# Patient Record
Sex: FEMALE | Race: AMERICAN INDIAN OR ALASKA NATIVE
[De-identification: names, ages, dates, MRNs, and addresses within clinical notes are randomized per-mention and may not be internally consistent; named-entity substitution may affect disease eponyms.]

---

## 2017-05-17 ENCOUNTER — HOSPITAL ENCOUNTER (EMERGENCY)
Dept: HOSPITAL 5 - ED | Age: 62
Discharge: LEFT BEFORE DISCHARGE | End: 2017-05-17
Payer: SELF-PAY

## 2017-05-17 VITALS — DIASTOLIC BLOOD PRESSURE: 89 MMHG | SYSTOLIC BLOOD PRESSURE: 141 MMHG

## 2017-05-17 DIAGNOSIS — Z53.21: ICD-10-CM

## 2017-05-17 DIAGNOSIS — R11.2: Primary | ICD-10-CM

## 2017-05-17 DIAGNOSIS — R19.7: ICD-10-CM

## 2017-05-17 LAB
ANION GAP SERPL CALC-SCNC: 17 MMOL/L
BACTERIA #/AREA URNS HPF: (no result) /HPF
BASOPHILS NFR BLD AUTO: 0.7 % (ref 0–1.8)
BILIRUB UR QL STRIP: (no result)
BLOOD UR QL VISUAL: (no result)
BUN SERPL-MCNC: 10 MG/DL (ref 7–17)
BUN/CREAT SERPL: 20 %
CALCIUM SERPL-MCNC: 9.2 MG/DL (ref 8.4–10.2)
CHLORIDE SERPL-SCNC: 97.2 MMOL/L (ref 98–107)
CO2 SERPL-SCNC: 27 MMOL/L (ref 22–30)
EOSINOPHIL NFR BLD AUTO: 4.5 % (ref 0–4.3)
GLUCOSE SERPL-MCNC: 103 MG/DL (ref 65–100)
HCT VFR BLD CALC: 33.4 % (ref 30.3–42.9)
HGB BLD-MCNC: 11 GM/DL (ref 10.1–14.3)
KETONES UR STRIP-MCNC: (no result) MG/DL
LEUKOCYTE ESTERASE UR QL STRIP: (no result)
MCH RBC QN AUTO: 35 PG (ref 28–32)
MCHC RBC AUTO-ENTMCNC: 33 % (ref 30–34)
MCV RBC AUTO: 107 FL (ref 79–97)
MUCOUS THREADS #/AREA URNS HPF: (no result) /HPF
NITRITE UR QL STRIP: (no result)
PH UR STRIP: 7 [PH] (ref 5–7)
PLATELET # BLD: 51 K/MM3 (ref 140–440)
POTASSIUM SERPL-SCNC: 2.8 MMOL/L (ref 3.6–5)
RBC # BLD AUTO: 3.12 M/MM3 (ref 3.65–5.03)
RBC #/AREA URNS HPF: 10 /HPF (ref 0–6)
SODIUM SERPL-SCNC: 138 MMOL/L (ref 137–145)
UROBILINOGEN UR-MCNC: 4 MG/DL (ref ?–2)
WBC # BLD AUTO: 6.1 K/MM3 (ref 4.5–11)
WBC #/AREA URNS HPF: 5 /HPF (ref 0–6)

## 2017-05-17 PROCEDURE — 80048 BASIC METABOLIC PNL TOTAL CA: CPT

## 2017-05-17 PROCEDURE — 81001 URINALYSIS AUTO W/SCOPE: CPT

## 2017-05-17 PROCEDURE — 36415 COLL VENOUS BLD VENIPUNCTURE: CPT

## 2017-05-17 PROCEDURE — 85025 COMPLETE CBC W/AUTO DIFF WBC: CPT

## 2017-05-19 NOTE — ED ELOPEMENT REVIEW
ED Pt Elopement review





- Results review


Lab results: 





 Laboratory Tests











  05/17/17 05/17/17 05/17/17





  09:25 09:25 10:37


 


WBC  6.1  


 


RBC  3.12 L  


 


Hgb  11.0  


 


Hct  33.4  


 


MCV  107 H  


 


MCH  35 H  


 


MCHC  33  


 


RDW  13.0 L  


 


Plt Count  51 L  


 


Lymph % (Auto)  23.6  


 


Mono % (Auto)  14.2 H  


 


Eos % (Auto)  4.5 H  


 


Baso % (Auto)  0.7  


 


Lymph #  1.4  


 


Mono #  0.9 H  


 


Eos #  0.3  


 


Baso #  0.0  


 


Seg Neutrophils %  57.0  


 


Seg Neutrophils #  3.5  


 


Sodium   138 


 


Potassium   2.8 L* 


 


Chloride   97.2 L 


 


Carbon Dioxide   27 


 


Anion Gap   17 


 


BUN   10 


 


Creatinine   0.5 L 


 


Estimated GFR   > 60 


 


BUN/Creatinine Ratio   20.00 


 


Glucose   103 H 


 


Calcium   9.2 


 


Urine Color    Iwona


 


Urine Turbidity    Clear


 


Urine pH    7.0


 


Ur Specific Gravity    1.024


 


Urine Protein    100 mg/dl


 


Urine Glucose (UA)    Neg


 


Urine Ketones    Neg


 


Urine Blood    Neg


 


Urine Nitrite    Neg


 


Urine Bilirubin    Sm


 


Urine Ictotest    Negative


 


Urine Urobilinogen    4.0


 


Ur Leukocyte Esterase    Sm


 


Urine WBC (Auto)    5.0


 


Urine RBC (Auto)    10.0


 


U Epithel Cells (Auto)    3.0


 


Urine Bacteria (Auto)    1+


 


Urine Mucus    Few














- Call Back decision


Pt Call Back Decision: Call pt to return to ED ASAP (hypokalemia of 2.8 with 

diarrhea should be addressed.  Patient has a primary physician that she'll 

follow up with them otherwise return to the ED)

## 2017-05-31 ENCOUNTER — HOSPITAL ENCOUNTER (EMERGENCY)
Dept: HOSPITAL 5 - ED | Age: 62
Discharge: HOME | End: 2017-05-31
Payer: SELF-PAY

## 2017-05-31 VITALS — DIASTOLIC BLOOD PRESSURE: 77 MMHG | SYSTOLIC BLOOD PRESSURE: 113 MMHG

## 2017-05-31 DIAGNOSIS — M32.9: ICD-10-CM

## 2017-05-31 DIAGNOSIS — H53.8: Primary | ICD-10-CM

## 2017-05-31 LAB
ALBUMIN SERPL-MCNC: 3.6 G/DL (ref 3.9–5)
ALBUMIN/GLOB SERPL: 0.6 %
ALP SERPL-CCNC: 120 UNITS/L (ref 35–129)
ALT SERPL-CCNC: 43 UNITS/L (ref 7–56)
ANION GAP SERPL CALC-SCNC: 17 MMOL/L
APTT BLD: 37.1 SEC. (ref 24.2–36.6)
BILIRUB SERPL-MCNC: 1.9 MG/DL (ref 0.1–1.2)
BLASTOCYTES % (MANUAL): 0 %
BUN SERPL-MCNC: 9 MG/DL (ref 7–17)
BUN/CREAT SERPL: 18 %
CALCIUM SERPL-MCNC: 10 MG/DL (ref 8.4–10.2)
CHLORIDE SERPL-SCNC: 99.1 MMOL/L (ref 98–107)
CO2 SERPL-SCNC: 26 MMOL/L (ref 22–30)
GLUCOSE SERPL-MCNC: 94 MG/DL (ref 65–100)
HCT VFR BLD CALC: 34.2 % (ref 30.3–42.9)
HGB BLD-MCNC: 11.4 GM/DL (ref 10.1–14.3)
INR PPP: 1.22 (ref 0.87–1.13)
MACROCYTES BLD QL SMEAR: (no result)
MCH RBC QN AUTO: 36 PG (ref 28–32)
MCHC RBC AUTO-ENTMCNC: 33 % (ref 30–34)
MCV RBC AUTO: 108 FL (ref 79–97)
PLATELET # BLD: 130 K/MM3 (ref 140–440)
POTASSIUM SERPL-SCNC: 4.3 MMOL/L (ref 3.6–5)
PROT SERPL-MCNC: 9.4 G/DL (ref 6.3–8.2)
RBC # BLD AUTO: 3.18 M/MM3 (ref 3.65–5.03)
SODIUM SERPL-SCNC: 138 MMOL/L (ref 137–145)
TOTAL CELLS COUNTED PERCENT: 14
WBC # BLD AUTO: 5.4 K/MM3 (ref 4.5–11)

## 2017-05-31 PROCEDURE — 85610 PROTHROMBIN TIME: CPT

## 2017-05-31 PROCEDURE — 36415 COLL VENOUS BLD VENIPUNCTURE: CPT

## 2017-05-31 PROCEDURE — 85007 BL SMEAR W/DIFF WBC COUNT: CPT

## 2017-05-31 PROCEDURE — 85730 THROMBOPLASTIN TIME PARTIAL: CPT

## 2017-05-31 PROCEDURE — 70450 CT HEAD/BRAIN W/O DYE: CPT

## 2017-05-31 PROCEDURE — 85025 COMPLETE CBC W/AUTO DIFF WBC: CPT

## 2017-05-31 PROCEDURE — 80053 COMPREHEN METABOLIC PANEL: CPT

## 2017-05-31 NOTE — CAT SCAN REPORT
CT HEAD WITHOUT CONTRAST



INDICATION: Vision changes, flashing in eyes.



COMPARISON: None similar at this institution.



FINDINGS: Noncontrast head CT demonstrates age-appropriate, symmetric 

ventricles and sulci without acute or recent infarct, hemorrhage, mass 

effect or midline shift.  Mild periventricular hypodensities.  No 

abnormal extra-axial fluid collections. Posterior fossa structures and 

basilar cisterns appear within normal limits.



Symmetric eye globes. Clear paranasal sinuses and mastoid air cells. 

Intact calvarium. Normal overlying scalp soft tissues. Few radiopaque 

dental material incidentally noted.



CONCLUSION: No acute intracranial CT abnormality, as described.



Thank you for the opportunity to participate in this patient's care.

## 2017-05-31 NOTE — EMERGENCY DEPARTMENT REPORT
HPI





- General


Chief Complaint: Eye Problems


Time Seen by Provider: 17 16:48





- HPI


HPI: 


This is a 62-year-old [facility] female presents to the emergency department 

with a one-month history of intermittent flashing of lights in her vision.  She 

denies any headache, pain in the eyes.  The patient has history of lupus and 

says that her doctor back in [place] start her on a medication for lupus at 

about the same time that the symptoms began.  The medication is 

hydroxychloroquine.  She otherwise denies any other past medical history.  She 

denies any tobacco abuse or any illicit drug use.  She has not taken anything 

for symptoms prior to presentation.  She does not wear glasses or contacts.








ED Past Medical Hx





- Past Medical History


Previous Medical History?: Yes


Additional medical history: LUPUS





- Surgical History


Past Surgical History?: Yes


Additional Surgical History: .  HYSTERECTOMY





- Social History


Smoking Status: Never Smoker


Substance Use Type: None





ED Review of Systems


ROS: 


Stated complaint: VISION ISSUE /BOTH EYES/


Other details as noted in HPI





Comment: All other systems reviewed and negative


Constitutional: denies: chills, fever


Eyes: other (lights flashing in vision).  denies: eye pain, eye discharge


ENT: denies: ear pain, throat pain


Respiratory: denies: cough, shortness of breath, wheezing


Cardiovascular: denies: chest pain, palpitations


Gastrointestinal: denies: abdominal pain, nausea, diarrhea


Genitourinary: denies: urgency, dysuria, discharge


Musculoskeletal: denies: back pain, joint swelling, arthralgia


Skin: denies: rash, lesions


Neurological: denies: headache, weakness, paresthesias





Physical Exam





- Physical Exam


Vital Signs: 


 Vital Signs











  17





  14:30


 


Temperature 98.1 F


 


Pulse Rate 73


 


Respiratory 18





Rate 


 


Blood Pressure 113/77


 


O2 Sat by Pulse 100





Oximetry 











Physical Exam: 


GENERAL: The patient is well-developed well-nourished.


HEENT: Normocephalic.  Atraumatic.  Extraocular motions are intact.  Patient 

has moist mucous membranes.  Pupils equal reactive to light bilaterally.  No 

nystagmus.  Visual acuity: 20/13 both eyes, 20/15 OD, 20/15 OS


NECK: Supple.  Trachea is midline.


CHEST/LUNGS: Clear to auscultation.  There is no respiratory distress noted.


HEART/CARDIOVASCULAR: Regular.  There is no tachycardia.  There is no gallop 

rub or murmur.


ABDOMEN: Abdomen is soft, nontender.  Patient has normal bowel sounds.  There 

is no abdominal distention.


SKIN: Skin is warm and dry.


NEURO: The patient is awake, alert, and oriented.  The patient is cooperative.  

The patient has no focal neurologic deficits.  The patient has normal speech 

and gait.  Cranial nerves II through XII grossly intact.


MUSCULOSKELETAL: There is no tenderness or deformity.  There is no limitation 

range of motion.  There is no evidence of acute injury.








ED Course


 Vital Signs











  17





  14:30


 


Temperature 98.1 F


 


Pulse Rate 73


 


Respiratory 18





Rate 


 


Blood Pressure 113/77


 


O2 Sat by Pulse 100





Oximetry 














ED Medical Decision Making





- Lab Data


Result diagrams: 


 17 15:02





 17 15:02





- Radiology Data


Radiology results: report reviewed


CT of the head does not show any acute process including no hemorrhage, mass, 

shift, diffuse edema or skull fracture.








- Medical Decision Making


62-year-old female presents with the complaint of occasional flashing lights in 

her vision.  This occurs both in dark rooms and lit rooms.  It occurs only 

intermittently.  She denies any actual vision change and denies any headache or 

pain in the eyes.  It started around the time the patient was started on 

hydroxychloroquine so this could be one source of her symptoms.  This could be 

a ocular migraine.  It is less likely to be a retinal issue as it occurs in lit 

rooms and bilaterally.  However the patient appears safe for discharge home at 

this time.  Vital signs stable throughout her ED course.  She'll be given 

ophthalmology referrals and encouraged to return with any worsening or symptoms 

or any acute distress.








- Differential Diagnosis


ocular migraine, medication induced, retinal detachment


Critical Care Time: No


Critical care attestation.: 


If time is entered above; I have spent that time in minutes in the direct care 

of this critically ill patient, excluding procedure time.








ED Disposition


Clinical Impression: 


 Flashing lights seen





Disposition: DISCHARGED TO HOME OR SELFCARE


Is pt being admited?: No


Condition: Stable


Instructions:  Blurred Vision (ED)


Additional Instructions: 


Please follow-up with an ophthalmologist in the next few days if possible.  

Return to the ER with any worsening of your symptoms or any acute distress.


Referrals: 


ZEUS DENNIS MD [Staff Physician] - 3-5 Days


LANDY ARCOS MD [Staff Physician] - 3-5 Days


DUBINER,HARVEY B, MD [Staff Physician] - 3-5 Days


NELLIE STEPHENSON MD [Staff Physician] - 3-5 Days


Time of Disposition: 17:25

## 2018-05-20 ENCOUNTER — HOSPITAL ENCOUNTER (EMERGENCY)
Dept: HOSPITAL 5 - ED | Age: 63
Discharge: LEFT BEFORE DISCHARGE | End: 2018-05-20
Payer: SELF-PAY

## 2018-05-20 VITALS — DIASTOLIC BLOOD PRESSURE: 90 MMHG | SYSTOLIC BLOOD PRESSURE: 144 MMHG

## 2018-05-20 DIAGNOSIS — Z53.21: ICD-10-CM

## 2018-05-20 DIAGNOSIS — K62.5: Primary | ICD-10-CM

## 2018-05-20 LAB
ALBUMIN SERPL-MCNC: 3.4 G/DL (ref 3.9–5)
ALT SERPL-CCNC: 36 UNITS/L (ref 7–56)
APTT BLD: 38.3 SEC. (ref 24.2–36.6)
BASOPHILS # (AUTO): 0 K/MM3 (ref 0–0.1)
BASOPHILS NFR BLD AUTO: 1.1 % (ref 0–1.8)
BUN SERPL-MCNC: 14 MG/DL (ref 7–17)
BUN/CREAT SERPL: 28 %
CALCIUM SERPL-MCNC: 8.8 MG/DL (ref 8.4–10.2)
EOSINOPHIL # BLD AUTO: 0 K/MM3 (ref 0–0.4)
EOSINOPHIL NFR BLD AUTO: 1.1 % (ref 0–4.3)
HCT VFR BLD CALC: 31.5 % (ref 30.3–42.9)
HEMOLYSIS INDEX: 0
HGB BLD-MCNC: 10.4 GM/DL (ref 10.1–14.3)
INR PPP: 1.06 (ref 0.87–1.13)
LIPASE SERPL-CCNC: 140 UNITS/L (ref 13–60)
LYMPHOCYTES # BLD AUTO: 1.5 K/MM3 (ref 1.2–5.4)
LYMPHOCYTES NFR BLD AUTO: 33 % (ref 13.4–35)
MCH RBC QN AUTO: 36 PG (ref 28–32)
MCHC RBC AUTO-ENTMCNC: 33 % (ref 30–34)
MCV RBC AUTO: 107 FL (ref 79–97)
MONOCYTES # (AUTO): 0.4 K/MM3 (ref 0–0.8)
MONOCYTES % (AUTO): 9.7 % (ref 0–7.3)
PLATELET # BLD: 70 K/MM3 (ref 140–440)
RBC # BLD AUTO: 2.94 M/MM3 (ref 3.65–5.03)

## 2018-05-20 PROCEDURE — 85610 PROTHROMBIN TIME: CPT

## 2018-05-20 PROCEDURE — 86850 RBC ANTIBODY SCREEN: CPT

## 2018-05-20 PROCEDURE — 86901 BLOOD TYPING SEROLOGIC RH(D): CPT

## 2018-05-20 PROCEDURE — 85730 THROMBOPLASTIN TIME PARTIAL: CPT

## 2018-05-20 PROCEDURE — 86900 BLOOD TYPING SEROLOGIC ABO: CPT

## 2018-05-20 PROCEDURE — 80053 COMPREHEN METABOLIC PANEL: CPT

## 2018-05-20 PROCEDURE — 83690 ASSAY OF LIPASE: CPT

## 2018-05-20 PROCEDURE — 85025 COMPLETE CBC W/AUTO DIFF WBC: CPT

## 2018-05-20 PROCEDURE — 36415 COLL VENOUS BLD VENIPUNCTURE: CPT
